# Patient Record
Sex: FEMALE | HISPANIC OR LATINO | Employment: UNEMPLOYED | URBAN - METROPOLITAN AREA
[De-identification: names, ages, dates, MRNs, and addresses within clinical notes are randomized per-mention and may not be internally consistent; named-entity substitution may affect disease eponyms.]

---

## 2017-08-11 ENCOUNTER — APPOINTMENT (OUTPATIENT)
Dept: RADIOLOGY | Facility: MEDICAL CENTER | Age: 82
End: 2017-08-11
Attending: EMERGENCY MEDICINE
Payer: COMMERCIAL

## 2017-08-11 ENCOUNTER — HOSPITAL ENCOUNTER (EMERGENCY)
Facility: MEDICAL CENTER | Age: 82
End: 2017-08-11
Attending: EMERGENCY MEDICINE
Payer: COMMERCIAL

## 2017-08-11 VITALS
WEIGHT: 163.14 LBS | TEMPERATURE: 97.5 F | DIASTOLIC BLOOD PRESSURE: 61 MMHG | HEIGHT: 55 IN | HEART RATE: 58 BPM | OXYGEN SATURATION: 91 % | BODY MASS INDEX: 37.76 KG/M2 | SYSTOLIC BLOOD PRESSURE: 126 MMHG | RESPIRATION RATE: 16 BRPM

## 2017-08-11 DIAGNOSIS — S09.90XA CLOSED HEAD INJURY, INITIAL ENCOUNTER: ICD-10-CM

## 2017-08-11 DIAGNOSIS — S29.019A STRAIN OF THORACIC REGION, INITIAL ENCOUNTER: ICD-10-CM

## 2017-08-11 DIAGNOSIS — S16.1XXA CERVICAL STRAIN, INITIAL ENCOUNTER: ICD-10-CM

## 2017-08-11 DIAGNOSIS — S20.212A CHEST WALL CONTUSION, LEFT, INITIAL ENCOUNTER: ICD-10-CM
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
## 2017-08-11 LAB
ALBUMIN SERPL BCP-MCNC: 3.5 G/DL (ref 3.2–4.9)
ALBUMIN/GLOB SERPL: 0.9 G/DL
ALP SERPL-CCNC: 82 U/L (ref 30–99)
ALT SERPL-CCNC: 21 U/L (ref 2–50)
ANION GAP SERPL CALC-SCNC: 7 MMOL/L (ref 0–11.9)
AST SERPL-CCNC: 29 U/L (ref 12–45)
BASOPHILS # BLD AUTO: 1.1 % (ref 0–1.8)
BASOPHILS # BLD: 0.12 K/UL (ref 0–0.12)
BILIRUB SERPL-MCNC: 0.8 MG/DL (ref 0.1–1.5)
BUN SERPL-MCNC: 19 MG/DL (ref 8–22)
CALCIUM SERPL-MCNC: 8.7 MG/DL (ref 8.4–10.2)
CHLORIDE SERPL-SCNC: 106 MMOL/L (ref 96–112)
CO2 SERPL-SCNC: 25 MMOL/L (ref 20–33)
CREAT SERPL-MCNC: 1.35 MG/DL (ref 0.5–1.4)
EOSINOPHIL # BLD AUTO: 1.66 K/UL (ref 0–0.51)
EOSINOPHIL NFR BLD: 15.8 % (ref 0–6.9)
ERYTHROCYTE [DISTWIDTH] IN BLOOD BY AUTOMATED COUNT: 48.8 FL (ref 35.9–50)
GFR SERPL CREATININE-BSD FRML MDRD: 38 ML/MIN/1.73 M 2
GLOBULIN SER CALC-MCNC: 3.9 G/DL (ref 1.9–3.5)
GLUCOSE SERPL-MCNC: 93 MG/DL (ref 65–99)
HCT VFR BLD AUTO: 38.5 % (ref 37–47)
HGB BLD-MCNC: 12.7 G/DL (ref 12–16)
IMM GRANULOCYTES # BLD AUTO: 0.03 K/UL (ref 0–0.11)
IMM GRANULOCYTES NFR BLD AUTO: 0.3 % (ref 0–0.9)
LYMPHOCYTES # BLD AUTO: 3.68 K/UL (ref 1–4.8)
LYMPHOCYTES NFR BLD: 34.9 % (ref 22–41)
MCH RBC QN AUTO: 31.6 PG (ref 27–33)
MCHC RBC AUTO-ENTMCNC: 33 G/DL (ref 33.6–35)
MCV RBC AUTO: 95.8 FL (ref 81.4–97.8)
MONOCYTES # BLD AUTO: 0.6 K/UL (ref 0–0.85)
MONOCYTES NFR BLD AUTO: 5.7 % (ref 0–13.4)
NEUTROPHILS # BLD AUTO: 4.44 K/UL (ref 2–7.15)
NEUTROPHILS NFR BLD: 42.2 % (ref 44–72)
NRBC # BLD AUTO: 0 K/UL
NRBC BLD AUTO-RTO: 0 /100 WBC
PLATELET # BLD AUTO: 272 K/UL (ref 164–446)
PMV BLD AUTO: 11.1 FL (ref 9–12.9)
POTASSIUM SERPL-SCNC: 4.2 MMOL/L (ref 3.6–5.5)
PROT SERPL-MCNC: 7.4 G/DL (ref 6–8.2)
RBC # BLD AUTO: 4.02 M/UL (ref 4.2–5.4)
SODIUM SERPL-SCNC: 138 MMOL/L (ref 135–145)
WBC # BLD AUTO: 10.5 K/UL (ref 4.8–10.8)

## 2017-08-11 PROCEDURE — 71260 CT THORAX DX C+: CPT

## 2017-08-11 PROCEDURE — 36415 COLL VENOUS BLD VENIPUNCTURE: CPT

## 2017-08-11 PROCEDURE — 72125 CT NECK SPINE W/O DYE: CPT

## 2017-08-11 PROCEDURE — 85025 COMPLETE CBC W/AUTO DIFF WBC: CPT

## 2017-08-11 PROCEDURE — 80053 COMPREHEN METABOLIC PANEL: CPT

## 2017-08-11 PROCEDURE — 99284 EMERGENCY DEPT VISIT MOD MDM: CPT

## 2017-08-11 PROCEDURE — 74176 CT ABD & PELVIS W/O CONTRAST: CPT

## 2017-08-11 PROCEDURE — 70450 CT HEAD/BRAIN W/O DYE: CPT

## 2017-08-11 RX ORDER — HYDROCODONE BITARTRATE AND ACETAMINOPHEN 5; 325 MG/1; MG/1
0.5 TABLET ORAL EVERY 6 HOURS PRN
Qty: 12 TAB | Refills: 0 | Status: SHIPPED | OUTPATIENT
Start: 2017-08-11

## 2017-08-11 ASSESSMENT — PAIN SCALES - GENERAL: PAINLEVEL_OUTOF10: 4

## 2017-08-11 NOTE — ED AVS SNAPSHOT
8/11/2017    Kita Luna  [address] [place] [address]    Dear Kita:    [facility] wants to ensure your discharge home is safe and you or your loved ones have had all of your questions answered regarding your care after you leave the hospital.    Below is a list of resources and contact information should you have any questions regarding your hospital stay, follow-up instructions, or active medical symptoms.    Questions or Concerns Regarding… Contact   Medical Questions Related to Your Discharge  (7 days a week, 8am-5pm) Contact a Nurse Care Coordinator   [phone]   Medical Questions Not Related to Your Discharge  (24 hours a day / 7 days a week)  Contact the Nurse Health Line   [phone]    Medications or Discharge Instructions Refer to your discharge packet   or contact your [facility] Primary Care Provider   [phone]   Follow-up Appointment(s) Schedule your appointment via The Hunt   or contact Scheduling [phone]   Billing Review your statement via The Hunt  or contact Billing [phone]   Medical Records Review your records via The Hunt   or contact Medical Records [phone]     You may receive a telephone call within two days of discharge. This call is to make certain you understand your discharge instructions and have the opportunity to have any questions answered. You can also easily access your medical information, test results and upcoming appointments via the The Hunt free online health management tool. You can learn more and sign up at Receptor/The Hunt. For assistance setting up your The Hunt account, please call [phone].    Once again, we want to ensure your discharge home is safe and that you have a clear understanding of any next steps in your care. If you have any questions or concerns, please do not hesitate to contact us, we are here for you. Thank you for choosing [facility] for your healthcare needs.    Sincerely,    Your [facility] Healthcare Team

## 2017-08-11 NOTE — ED AVS SNAPSHOT
Home Care Instructions                                                                                                                Kita Luna   MRN: [id number]    Department:  [facility], Emergency Dept   Date of Visit:  8/11/2017            [facility], Emergency Dept    [address]    [place] [address]    Phone:  [phone]      You were seen by     Claude Kim M.D.      Your Diagnosis Was     Fall, initial encounter     W19.XXXA       These are the medications you received during your hospitalization from 08/11/2017 1828 to 08/11/2017 2203     Date/Time Order Dose Route Action    08/11/2017 2215 hydrocodone-acetaminophen 2.5-108 mg/5mL (HYCET) solution 5 mL 5 mL Oral Refused      Follow-up Information     1. Follow up with Your Physician. Schedule an appointment as soon as possible for a visit in 2 days.    Specialty:  Emergency Medicine    Contact information    Varies        Medication Information     Review all of your home medications and newly ordered medications with your primary doctor and/or pharmacist as soon as possible. Follow medication instructions as directed by your doctor and/or pharmacist.     Please keep your complete medication list with you and share with your physician. Update the information when medications are discontinued, doses are changed, or new medications (including over-the-counter products) are added; and carry medication information at all times in the event of emergency situations.               Medication List      START taking these medications        Instructions    Morning Afternoon Evening Bedtime    hydrocodone-acetaminophen 5-325 MG Tabs per tablet   Commonly known as:  NORCO        Take 0.5 Tabs by mouth every 6 hours as needed.   Dose:  0.5 Tab                             Where to Get Your Medications      You can get these medications from any pharmacy     Bring a paper prescription for each of  these medications    - hydrocodone-acetaminophen 5-325 MG Tabs per tablet            Procedures and tests performed during your visit     CBC WITH DIFFERENTIAL    COMP METABOLIC PANEL    CONSENT FOR CONTRAST INJECTION    CT-CHEST,ABDOMEN,PELVIS W/O    CT-CSPINE WITHOUT PLUS RECONS    CT-HEAD W/O    ESTIMATED GFR    IV SALINE LOCK    IV Saline Lock        Discharge Instructions       Rest, follow-up with her doctor.  Return earlier for pain, or other concerns.  Take her pain.    Traumatismo Torácico Contuso  (Blunt Chest Trauma)   El traumatismo torácico contuso es conrado lesión causada por un golpe en el pecho. Estas lesiones suelen ser muy dolorosas. Generalmente resulta en un hematoma o en costillas rotas (fracturadas). La mayor parte de los hematomas y las fracturas de costillas por traumatismos torácicos contusos mejoran después de 1 a 3 semanas de reposo y uso de medicamentos para el dolor. Generalmente, los tejidos blandos de la pared torácica también se lesionan, lo que produce dolor y hematomas. Los órganos internos, coreen el corazón y los pulmones, también pueden sufrir lesiones. El traumatismo torácico contuso puede producir problemas médicos graves. Leonora tipo de lesión requiere atención médica inmediata.   CAUSAS   · Colisiones en vehículos de motor.  · Caídas.  · Violencia física.  · Lesiones deportivas.  SÍNTOMAS   · Dolor en el pecho. El dolor puede empeorar al moverse o respirar profundamente.  · Falta de aire.  · Aturdimiento.  · Hematomas.  · Sensibilidad.  · Hinchazón.  DIAGNÓSTICO   El médico le hará un examen físico. Podrán tomarle radiografías para comprobar si hay fracturas. Sin embargo, las fracturas pequeñas en las costillas pueden no aparecer en las radiografías hasta unos nabeel después de la lesión. Si se sospecha conrado lesión más grave, podrán indicarle otras pruebas de diagnóstico por imágenes. Puede incluir ecografías, tomografía computada (TC) o resonancia magnética (IMR).   TRATAMIENTO   El  tratamiento depende de la gravedad de la lesión. El médico podrá recetarle medicamentos para calmar el dolor e indicarle ejercicios de respiración profunda.   INSTRUCCIONES PARA EL CUIDADO EN EL HOGAR   · Limite radha actividades hasta que pueda moverse sin sentir demasiado dolor.  · No realice trabajos extenuantes hasta que la lesión se haya curado.  · Aplique hielo sobre la danielle lesionada.  · Ponga el hielo en conrado bolsa plástica.  · Colóquese conrado toalla entre la piel y la bolsa de hielo.  · Deje el hielo shaggy 15 a 20 minutos, 3 a 4 veces por día.  · Podrá utilizar conrado faja para las costillas para reducir el dolor según le hayan indicado.  · Realice inspiraciones, profundas según las indicaciones del médico, para mantener los pulmones limpios.  · Sólo tome medicamentos de venta amira o recetados para calmar el dolor, la fiebre, o el malestar, según las indicaciones de alonso médico.  SOLICITE ATENCIÓN MÉDICA DE INMEDIATO SI:  · Siente falta de aire o dolor en el pecho cada vez más intensos.  · Tose y escupe paige.  · Tiene náuseas, vómitos o dolor abdominal.  · Tiene fiebre.  · Se siente mareado, débil o se desmaya.  ASEGÚRESE DE QUE:   · Comprende estas instrucciones.  · Controlará alonso enfermedad.  · Solicitará ayuda de inmediato si no mejora o si empeora.     Esta información no tiene coreen fin reemplazar el consejo del médico. Asegúrese de hacerle al médico cualquier pregunta que tenga.  Distensión cervical  (Cervical Sprain)  Conrado distensión cervical es conrado lesión en el cris, en la que los tejidos austyn y fibrosos (ligamentos) que unen los huesos del cris, se distienden o se rompen. Conrado distensión cervical puede ser desde muy leve a muy grave. En los casos graves pueden hacer que las vértebras del cris se vuelvan inestables. [place] puede causar un daño en la médula valenzuela y puede yeimi lugar a graves problemas del sistema nervioso. La cantidad de tiempo que demora la mejoría de conrado distensión cervical depende  de la causa y de la extensión de la lesión. La mayoría de las veces se aaron en 1 a 3 semanas.  CAUSAS   Las distensiones graves pueden ser causadas por:   · Lesiones por deportes de contacto (coreen en el fútbol americano, rugby, carroll, hockey, automovilismo, gimnasia, buceo, artes marciales y boxeo).  · Colisiones en vehículos de motor.  · Lesiones de latigazo cervical. Esta es conrado lesión por movimiento brusco de adelante hacia atrás de la oneil y el cris.  · Caídas.  La causa de las distensiones cervicales leves pueden ser:   · Adoptar posiciones incómodas, coreen sostener el teléfono entre la oreja y el hombro.  · Sentarse en conrado silla que no ofrece el soporte adecuado.  · Trabajar en conrado luis de computadora mal diseñada.  · Las actividades que requieren mirar hacia arriba o hacia abajo shaggy largos períodos.  SÍNTOMAS   · Dolor, sensibilidad, rigidez, o sensación de ardor en la parte anterior, posterior o lateral del cris. Shaneka malestar puede aparecer inmediatamente después de la lesión o puede desarrollarse lentamente y no empezar hasta 24 horas o más después de la lesión.  · Dolor o sensibilidad que se siente directamente en la parte media posterior del cris.  · Dolor en el hombro o la danielle superior de la espalda.  · Capacidad limitada para  el cris.  · Dolor de oneil.  · Mareos.  · Debilidad, entumecimiento u hormigueo en las arsenio o los brazos.  · Espasmos musculares.  · Dificultades para tragar o comer.  · Sensibilidad e hinchazón en el cris.  DIAGNÓSTICO   La mayoría de las veces, el médico puede diagnosticar shaneka problema mediante la historia clínica y un examen físico. Jacques médico le preguntará acerca de lesiones previas y problemas conocidos coreen artritis en el cris. Podrán tomarle radiografías para determinar si hay otros problemas, coreen enfermedades en los huesos del cris. También puede ser necesario realizar otras pruebas, coreen tomografías computadas o resonancia magnética.      TRATAMIENTO   El tratamiento depende de la gravedad de la distensión. Las distensiones leves se pueden tratar con reposo, manteniendo el cris en alonso lugar (inmovilización) y usando medicamentos para el dolor. Las distensiones graves deben ser inmediatamente inmovilizadas. Será necesario completar el tratamiento para aliviar el dolor, los espasmos musculares y otros síntomas, y puede incluir.  · Medicamentos coreen calmantes para el dolor, anestésicos o relajantes musculares.  · Fisioterapia. Beatrice puede incluir ejercicios de elongación, fortalecimiento y entrenamiento de la postura. Los ejercicios y conrado mejor postura pueden ayudar a estabilizar el cris, fortalecer los músculos y evitar que los síntomas vuelvan a aparecer.  INSTRUCCIONES PARA EL CUIDADO EN EL HOGAR   · Aplique hielo sobre la danielle lesionada.  · Ponga el hielo en conrado bolsa plástica.  · Colóquese conrado toalla entre la piel y la bolsa de hielo.  · Deje el hielo shaggy 15 - 20 minutos y aplíquelo 3 - 4 veces por día.  · Si la lesión fue grave, le indicarán el uso de un collarín cervical. El collarín cervical es un collar de dos piezas para impedir que el cris se mueva mientras se aaron.  · Nose quite el collarín excepto que se lo indique alonso médico.  · Si tiene el ricardo whitney, manténgalo fuera del collarín.  · Consulte a alonso médico antes de hacerle ajustes. Los ajustes menores pueden ser requeridos con el tiempo para mejorar el confort y reducir la presión sobre la barbilla o en la parte posterior de la oneil.  · Si le permiten quitarse el collarín para lavarlo o darse un baño, siga las indicaciones de alonso médico acerca de cómo hacerlo con seguridad.  · Mantenga el collarín limpio pasando un paño con agua y jabón y secándolo olya. Si el collarín tiene almohadillas removibles, quítelas cada 1-2 días para lavarlas a mano con agua y jabón. Deje que se sequen al aire. Debe secarlas olya antes de volver a colocarlas en el collarín.  · Si le permiten quitarse  el collarín para lavarlo y darse un baño, lave y seque la piel del cris. Controle alonso piel para detectar irritación o llagas. Si las tiene, informe a alonso médico.  · No conduzca vehículos mientras usa el collarín.  · Sólo tome medicamentos de venta amira o recetados para calmar el dolor, el malestar o bajar la fiebre, según las indicaciones de alonso médico.  · Cumpla con todas las visitas de control, según le indique alonso médico.  · Cumpla con todas las sesiones de fisioterapia, según le indique alonso médico.  · Candice los ajustes necesarios en alonso lugar de trabajo para favorecer conrado buena postura.  · Evite las posiciones y actividades que empeoran los síntomas.  · Candice precalentamiento y elongue antes de comenzar conrado actividad para evitar problemas.  SOLICITE ATENCIÓN MÉDICA SI:   · El dolor no se stefanie con los medicamentos.  · No puede disminuir la dosis de analgésicos según lo planificado.  · Alonso nivel de actividad no mejora según lo esperado.  SOLICITE ATENCIÓN MÉDICA DE INMEDIATO SI:   · Presenta cualquier hemorragia.  · Siente malestar estomacal.  · Tiene signos de reacción alérgica a los medicamentos.  · Los síntomas empeoran.  · Le aparecen síntomas nuevos e inexplicables.  · Siente adormecimiento, hormigueo, debilidad o parálisis en alguna parte del cuerpo.  ASEGÚRESE DE QUE:   · Comprende estas instrucciones.  · Controlará alonso afección.  · Recibirá ayuda de inmediato si no mejora o si empeora.     Esta información no tiene coreen fin reemplazar el consejo del médico. Asegúrese de hacerle al médico cualquier pregunta que tenga.     Document Released: 03/16/2010 Document Revised: 10/08/2014  Elsevier Interactive Patient Education ©2016 [employer]    Traumatismo en la oneil - Adultos  (Head Injury, Adult)  Tiene conrado lesión en la oneil. Después de sufrir conrado lesión en la oneil, es normal tener albert de oneil y vomitar. Si se duerme, debería resultar fácil despertarlo. Algunas veces debe permanecer en el hospital. La  mayoría de los problemas ocurren shaggy las primeras 24 horas. Los efectos secundarios pueden aparecer entre los 7 y 10 días posteriores a la lesión.   ¿CUÁLES SON LOS TIPOS DE LESIONES EN LA HEBER?  Las lesiones en la heber pueden ser leves y provocar un bulto. Algunas lesiones en la heber pueden ser más graves. Algunas de las lesiones graves en la heber son:  · Lesión que provoque un impacto en el cerebro (conmoción).  · Hematoma en el cerebro (contusión). [place] significa que hay hemorragia en el cerebro que puede causar un edema.  · Fisura en el cráneo (fractura de cráneo).  · Hemorragia en el cerebro que se acumula, se coagula y forma un bulto (hematoma).  ¿CUÁNDO TIFFANIE OBTENER AYUDA DE INMEDIATO?   · Está confundido o somnoliento.  · No pueden despertarlo.  · Tiene malestar estomacal (náuseas) o vómitos.  · Los mareos o la inestabilidad empeoran.  · Sufre albert de heber intensos y prolongados que no se alivian con medicamentos. [place] los medicamentos solamente coreen se lo haya indicado el médico.  · No puede  los brazos o las piernas normalmente.  · No puede caminar.  · Observa cambios en los puntos negros en el centro de la parte coloreada del mika (pupila).  · Presenta nithin secreción josemanuel o con paige que proviene de la nariz o de los oídos.  · Tiene dificultad para reggie.  Shaggy las próximas 24 horas posteriores a la lesión, debe permanecer con alguna persona que pueda cuidarlo. Esta persona debe pedir ayuda de inmediato (llamar al 911 en los [place] si usted empieza a tener temblores y no puede controlarlos (tiene convulsiones), se desmaya o no puede despertarse.  ¿CÓMO PUEDO PREVENIR NITHIN LESIÓN EN LA HEBER EN EL FUTURO?  · Use un cinturón de seguridad.  · Use un bette si cory en bicicleta y practica deportes, coreen fútbol americano.  · Evite las actividades peligrosas que puedan realizarse en la casa.  ¿CUÁNDO PUEDO RETOMAR LAS ACTIVIDADES NORMALES Y EL ATLETISMO?  Consulte a alonso médico antes de  hacer estas actividades. No debe hacer actividades normales ni practicar deportes de contacto hasta 1 semana después de que hayan desaparecido los siguientes síntomas:  · Dolor de oneil akiko.  · Mareos.  · Atención deficiente.  · Confusión.  · Problemas de memoria.  · Malestar estomacal o vómitos.  · Cansancio.  · Irritabilidad.  · Intolerancia a la meghan brillante o los ruidos austyn.  · Ansiedad o depresión.  · Sueño agitado.  ASEGÚRESE DE QUE:   · Comprende estas instrucciones.  · Controlará alonso afección.  · Recibirá ayuda de inmediato si no mejora o si empeora.     Esta información no tiene coreen fin reemplazar el consejo del médico. Asegúrese de hacerle al médico cualquier pregunta que tenga.     Document Released: 10/08/2014 Document Revised: 01/08/2016  [employer] Interactive Patient Education ©2016 [employer]            Patient Information     Patient Information    Following emergency treatment: all patient requiring follow-up care must return either to a private physician or a clinic if your condition worsens before you are able to obtain further medical attention, please return to the emergency room.     Billing Information    At [facility], we work to make the billing process streamlined for our patients.  Our Representatives are here to answer any questions you may have regarding your hospital bill.  If you have insurance coverage and have supplied your insurance information to us, we will submit a claim to your insurer on your behalf.  Should you have any questions regarding your bill, we can be reached online or by phone as follows:  Online: You are able pay your bills online or live chat with our representatives about any billing questions you may have. We are here to help Monday - Friday from 8:00am to 7:30pm and 9:00am - 12:00pm on Saturdays.  Please visit [email or web address]  for more information.   Phone:  [phone] or [phone]    Please note that  your emergency physician, surgeon, pathologist, radiologist, anesthesiologist, and other specialists are not employed by [facility] and will therefore bill separately for their services.  Please contact them directly for any questions concerning their bills at the numbers below:     Emergency Physician Services:  [phone]  [facility]:  [phone]  [facility]:  [phone]  [facility]:  [phone]    1. Your final bill may vary from the amount quoted upon discharge if all procedures are not complete at that time, or if your doctor has additional procedures of which we are not aware. You will receive an additional bill if you return to the Emergency Department at [facility] for suture removal regardless of the facility of which the sutures were placed.     2. Please arrange for settlement of this account at the emergency registration.    3. All self-pay accounts are due in full at the time of treatment.  If you are unable to meet this obligation then payment is expected within 4-5 days.     4. If you have had radiology studies (CT, X-ray, Ultrasound, MRI), you have received a preliminary result during your emergency department visit. Please contact the radiology department [phone] to receive a copy of your final result. Please discuss the Final result with your primary physician or with the follow up physician provided.     Crisis Hotline:  [place] Crisis Hotline:  3-314-FEOGDLR or 1-498.420.5979  [place] Crisis Hotline:    1-947.667.8450 or 241-320-9322         ED Discharge Follow Up Questions    1. In order to provide you with very good care, we would like to follow up with a phone call in the next few days.  May we have your permission to contact you?     YES /  NO    2. What is the best phone number to call you? (       )_____-__________    3. What is the best time to call you?      Morning  /  Afternoon  /  Evening                   Patient  Signature:  ____________________________________________________________    Date:  ____________________________________________________________

## 2017-08-11 NOTE — ED AVS SNAPSHOT
Online Agility Access Code: [email or web address]  Expires: 9/10/2017  9:55 PM    Your email address is not on file at [facility].  Email Addresses are required for you to sign up for Online Agility, please contact [phone] to verify your personal information and to provide your email address prior to attempting to register for Online Agility.    Kita Luna  [address] [place] [address]    Online Agility  A secure, online tool to manage your health information     [facility]’s Online Agility® is a secure, online tool that connects you to your personalized health information from the privacy of your home -- day or night - making it very easy for you to manage your healthcare. Once the activation process is completed, you can even access your medical information using the Online Agility clemencia, which is available for free in the Apple Clemencia store or Google Play store.     To learn more about Online Agility, visit [email or web address]    There are two levels of access available (as shown below):   My Chart Features  [facility] Primary Care Doctor [facility]  Specialists [facility]  Urgent  Care Non-[facility] Primary Care Doctor   Email your healthcare team securely and privately 24/7 X X X    Manage appointments: schedule your next appointment; view details of past/upcoming appointments X      Request prescription refills. X      View recent personal medical records, including lab and immunizations X X X X   View health record, including health history, allergies, medications X X X X   Read reports about your outpatient visits, procedures, consult and ER notes X X X X   See your discharge summary, which is a recap of your hospital and/or ER visit that includes your diagnosis, lab results, and care plan X X  X     How to register for The Huffington Postt:  Once your e-mail address has been verified, follow the following steps to sign up for The Huffington Postt.     1. Go to  [email or web address]  2. Click on the Sign Up Now box, which takes you to the New Member Sign Up page. You will  need to provide the following information:  a. Enter your Yab[employer]u Access Code exactly as it appears at the top of this page. (You will not need to use this code after you’ve completed the sign-up process. If you do not sign up before the expiration date, you must request a new code.)   b. Enter your date of birth.   c. Enter your home email address.   d. Click Submit, and follow the next screen’s instructions.  3. Create a eLong.comt ID. This will be your Yabidu login ID and cannot be changed, so think of one that is secure and easy to remember.  4. Create a Yabidu password. You can change your password at any time.  5. Enter your Password Reset Question and Answer. This can be used at a later time if you forget your password.   6. Enter your e-mail address. This allows you to receive e-mail notifications when new information is available in [employer]ab[employer].  7. Click Sign Up. You can now view your health information.    For assistance activating your Yab[employer] account, call [phone]

## 2017-08-12 NOTE — ED PROVIDER NOTES
"ED Provider Note    CHIEF COMPLAINT  Chief Complaint   Patient presents with   • T-5000 FALL   • Back Pain       HPI  Kita Luna is a 81 y.o. female who presents to the ER.  Clear back pain, chest pain, abdominal pain, head pain and neck pain after a fall.  Patient was walking with a walker going up a ramp when she hit a small bump and the ramp, though patient then fell, somehow the walker was behind her and she fell with her back, striking the walker and her head hitting the ground.  She left flank pain, diffuse abdominal pain, back pain, neck pain and head pain since that time.  The patient describes a worsened.  She denies any other acute concerns or complaints.  No hematemesis nor hematochezia.  No blood thinners.  Unclear LOC.  No other injuries or complaints.    REVIEW OF SYSTEMS  See HPI for further details. All other systems are negative.    PAST MEDICAL HISTORY  Past Medical History   Diagnosis Date   • Asthma        FAMILY HISTORY  History reviewed. No pertinent family history.    SOCIAL HISTORY  Social History     Social History   • Marital Status: N/A     Spouse Name: N/A   • Number of Children: N/A   • Years of Education: N/A     Social History Main Topics   • Smoking status: Never Smoker    • Smokeless tobacco: Never Used   • Alcohol Use: No   • Drug Use: No   • Sexual Activity: Not Asked     Other Topics Concern   • None     Social History Narrative   • None       SURGICAL HISTORY  History reviewed. No pertinent past surgical history.    CURRENT MEDICATIONS  Home Medications     Reviewed by Mitul Toscano R.N. (Registered Nurse) on 08/11/17 at 1836  Med List Status: Unable to Obtain    Medication Last Dose Status          Patient Freddy Taking any Medications                        ALLERGIES  Allergies   Allergen Reactions   • Penicillins Swelling       PHYSICAL EXAM  VITAL SIGNS: /61 mmHg  Pulse 64  Temp(Src) 36.4 °C (97.5 °F)  Resp 18  Ht 1.397 m (4' 7\")  Wt 74 kg (163 lb 2.3 " oz)  BMI 37.92 kg/m2  SpO2 92%     Constitutional: Well developed, Well nourished, No acute distress, Non-toxic appearance.   HENT: Normocephalic, posterior scalp contusion, no laceration, Bilateral external ears normal, Oropharynx moist, No oral exudates, Nose normal.   Eyes: PERRL, EOMI, Conjunctiva normal, No discharge.   Neck:   Cervical spine tenderness.  Not initially ranged.  Lymphatic: No lymphadenopathy noted.   Cardiovascular: Normal heart rate, Normal rhythm, No murmurs, No rubs, No gallops.   Thorax & Lungs: Normal breath sounds, No respiratory distress, No wheezing, that sided chest wall tenderness.  No crepitus  Abdomen: Bowel sounds normal, Soft, there is left upper quadrant, no signs of trauma  Skin: Warm, Dry, No erythema, No rash.   Back: Tenderness in the midthoracic and upper lumbar spine.  Musculoskeletal: Good range of motion in all major joints. No tenderness to palpation or major deformities noted.   Neurologic: Alert, Normal motor function, Normal sensory function, No focal deficits noted.   Psychiatric: Affect anxious.       RADIOLOGY/PROCEDURES  CT-CHEST,ABDOMEN,PELVIS W/O   Final Result      1.  No evidence for acute intrathoracic injury.   2.  Probable liver cysts.   3.  No gross evidence for acute intra-abdominal trauma.   4.  Probable chronic T12 compression deformity.   5.  No gross acute thoracic or lumbar spine fracture.   6.  Limited by lack of IV contrast.   7.  4 mm RIGHT middle lobe subpleural nodule.      Low Risk: No routine follow-up      High Risk: Optional CT at 12 months      Comments: Nodules less than 6 mm do not require routine follow-up, but certain patients at high risk with suspicious nodule morphology, upper lobe location, or both may warrant 12-month follow-up.      Low Risk - Minimal or absent history of smoking and of other known risk factors.      High Risk - History of smoking or of other known risk factors.      Note: These recommendations do not apply to  lung cancer screening, patients with immunosuppression, or patients with known primary cancer.      Fleischner Society 2017 Guidelines for Management of Incidentally Detected Pulmonary Nodules in Adults      CT-CSPINE WITHOUT PLUS RECONS   Final Result      1.  Mild degenerative changes.   2.  No cervical spine fracture or subluxation.      CT-HEAD W/O   Final Result      1.  Diffuse atrophy.   2.  No acute intracranial hemorrhage or territorial infarct.               COURSE & MEDICAL DECISION MAKING  Pertinent Labs & Imaging studies reviewed. (See chart for details)  An IV is established, basic labs are obtained.  We have ordered a CT scan.    History is obtained with the patient and the family acting as suspension turgor by their request.    Patient's labs are reassuring.  She has an IV contrast allergy, so she was not scan with contrast.    CT is limited by lack of contrast administration, but I think is sensitive enough.  She has no signs of head injury or neck injury.  She has a chronic T12 fracture.  She is medically chronic findings on her CT, including pulmonary nodules and findings.  She'll be made aware of by given a copy of the CT and the family and instructions to see her doctor.    As no signs of intrathoracic or intra-abdominal trauma, or other injuries that would Require admission or further workup.  Patient otherwise looks well.  She is able to ambulate.    The patient is noted to have elevated blood pressure.  All here in the ER.  She will be discharged to care of her family to return to the ER for worsening pain or other concerns.  Recommend over-the-counter pain medicines and follow-up.  Family is agreeable to plan.  Questions are answered.    FINAL IMPRESSION  1. Fall, initial encounter    2. Chest wall contusion, left, initial encounter    3. Closed head injury, initial encounter    4. Cervical strain, initial encounter    5. Strain of thoracic region, initial encounter      Patient is  requesting a prescription for pain medicine.  She is prescribed norco.  She does not live locally.  There is nothing a prescription history.  She is given high set prior to discharge.       The patient has elevated blood pressure.  This is chronic.  She'll see her doctor when she returns to [place].  She is given a copy of her labs and CTs and told to follow-up and review this with her physician.      Electronically signed by: Claude Kim, 8/11/2017 6:56 PM

## 2017-08-12 NOTE — ED NOTES
ERP at bedside. Pt agrees with plan of care discussed by ERP. AIDET acknowledged with patient. IV established. Blood sent to lab. Ralph in low position, side rail up for pt safety. Call light within reach. Will continue to monitor.

## 2017-08-12 NOTE — ED NOTES
Family member given discharge instructions.  Pt refused pain meds.  VSS- discharged with written instructions to follow up with MD and rest.  Pt out in wheelchair with family members

## 2017-08-12 NOTE — DISCHARGE INSTRUCTIONS
Rest, follow-up with her doctor.  Return earlier for pain, or other concerns.  Take her pain.    Traumatismo Torácico Contuso  (Blunt Chest Trauma)   El traumatismo torácico contuso es conrado lesión causada por un golpe en el pecho. Estas lesiones suelen ser muy dolorosas. Generalmente resulta en un hematoma o en costillas rotas (fracturadas). La mayor parte de los hematomas y las fracturas de costillas por traumatismos torácicos contusos mejoran después de 1 a 3 semanas de reposo y uso de medicamentos para el dolor. Generalmente, los tejidos blandos de la pared torácica también se lesionan, lo que produce dolor y hematomas. Los órganos internos, coreen el corazón y los pulmones, también pueden sufrir lesiones. El traumatismo torácico contuso puede producir problemas médicos graves. Leonora tipo de lesión requiere atención médica inmediata.   CAUSAS   · Colisiones en vehículos de motor.  · Caídas.  · Violencia física.  · Lesiones deportivas.  SÍNTOMAS   · Dolor en el pecho. El dolor puede empeorar al moverse o respirar profundamente.  · Falta de aire.  · Aturdimiento.  · Hematomas.  · Sensibilidad.  · Hinchazón.  DIAGNÓSTICO   El médico le hará un examen físico. Podrán tomarle radiografías para comprobar si hay fracturas. Sin embargo, las fracturas pequeñas en las costillas pueden no aparecer en las radiografías hasta unos nabeel después de la lesión. Si se sospecha conrado lesión más grave, podrán indicarle otras pruebas de diagnóstico por imágenes. Puede incluir ecografías, tomografía computada (TC) o resonancia magnética (IMR).   TRATAMIENTO   El tratamiento depende de la gravedad de la lesión. El médico podrá recetarle medicamentos para calmar el dolor e indicarle ejercicios de respiración profunda.   INSTRUCCIONES PARA EL CUIDADO EN EL HOGAR   · Limite radha actividades hasta que pueda moverse sin sentir demasiado dolor.  · No realice trabajos extenuantes hasta que la lesión se haya curado.  · Aplique hielo sobre la danielle  lesionada.  · Ponga el hielo en conrado bolsa plástica.  · Colóquese conrado toalla entre la piel y la bolsa de hielo.  · Deje el hielo shaggy 15 a 20 minutos, 3 a 4 veces por día.  · Podrá utilizar conrado faja para las costillas para reducir el dolor según le hayan indicado.  · Realice inspiraciones, profundas según las indicaciones del médico, para mantener los pulmones limpios.  · Sólo tome medicamentos de venta amira o recetados para calmar el dolor, la fiebre, o el malestar, según las indicaciones de alonso médico.  SOLICITE ATENCIÓN MÉDICA DE INMEDIATO SI:  · Siente falta de aire o dolor en el pecho cada vez más intensos.  · Tose y escupe paige.  · Tiene náuseas, vómitos o dolor abdominal.  · Tiene fiebre.  · Se siente mareado, débil o se desmaya.  ASEGÚRESE DE QUE:   · Comprende estas instrucciones.  · Controlará alonso enfermedad.  · Solicitará ayuda de inmediato si no mejora o si empeora.     Esta información no tiene coreen fin reemplazar el consejo del médico. Asegúrese de hacerle al médico cualquier pregunta que tenga.  Distensión cervical  (Cervical Sprain)  Conrado distensión cervical es conrado lesión en el cris, en la que los tejidos austyn y fibrosos (ligamentos) que unen los huesos del cris, se distienden o se rompen. Conrado distensión cervical puede ser desde muy leve a muy grave. En los casos graves pueden hacer que las vértebras del cris se vuelvan inestables. [place] puede causar un daño en la médula valenzuela y puede yeimi lugar a graves problemas del sistema nervioso. La cantidad de tiempo que demora la mejoría de conrado distensión cervical depende de la causa y de la extensión de la lesión. La mayoría de las veces se aaron en 1 a 3 semanas.  CAUSAS   Las distensiones graves pueden ser causadas por:   · Lesiones por deportes de contacto (coreen en el fútbol americano, rugby, carroll, hockey, automovilismo, gimnasia, buceo, artes marciales y boxeo).  · Colisiones en vehículos de motor.  · Lesiones de latigazo cervical. Esta es conrado  lesión por movimiento brusco de adelante hacia atrás de la oneil y el cris.  · Caídas.  La causa de las distensiones cervicales leves pueden ser:   · Adoptar posiciones incómodas, coreen sostener el teléfono entre la oreja y el hombro.  · Sentarse en conrado silla que no ofrece el soporte adecuado.  · Trabajar en conrado luis de computadora mal diseñada.  · Las actividades que requieren mirar hacia arriba o hacia abajo shaggy largos períodos.  SÍNTOMAS   · Dolor, sensibilidad, rigidez, o sensación de ardor en la parte anterior, posterior o lateral del cris. [place] malestar puede aparecer inmediatamente después de la lesión o puede desarrollarse lentamente y no empezar hasta 24 horas o más después de la lesión.  · Dolor o sensibilidad que se siente directamente en la parte media posterior del cris.  · Dolor en el hombro o la danielle superior de la espalda.  · Capacidad limitada para  el cris.  · Dolor de oneil.  · Mareos.  · Debilidad, entumecimiento u hormigueo en las arsenio o los brazos.  · Espasmos musculares.  · Dificultades para tragar o comer.  · Sensibilidad e hinchazón en el cris.  DIAGNÓSTICO   La mayoría de las veces, el médico puede diagnosticar shaneka problema mediante la historia clínica y un examen físico. Alonso médico le preguntará acerca de lesiones previas y problemas conocidos coreen artritis en el cris. Podrán tomarle radiografías para determinar si hay otros problemas, coreen enfermedades en los huesos del cris. También puede ser necesario realizar otras pruebas, coreen tomografías computadas o resonancia magnética.   TRATAMIENTO   El tratamiento depende de la gravedad de la distensión. Las distensiones leves se pueden tratar con reposo, manteniendo el cris en alonso lugar (inmovilización) y usando medicamentos para el dolor. Las distensiones graves deben ser inmediatamente inmovilizadas. Será necesario completar el tratamiento para aliviar el dolor, los espasmos musculares y otros síntomas, y puede  incluir.  · Medicamentos coreen calmantes para el dolor, anestésicos o relajantes musculares.  · Fisioterapia. [place] puede incluir ejercicios de elongación, fortalecimiento y entrenamiento de la postura. Los ejercicios y conrado mejor postura pueden ayudar a estabilizar el cris, fortalecer los músculos y evitar que los síntomas vuelvan a aparecer.  INSTRUCCIONES PARA EL CUIDADO EN EL HOGAR   · Aplique hielo sobre la danielle lesionada.  · Ponga el hielo en conrado bolsa plástica.  · Colóquese conrado toalla entre la piel y la bolsa de hielo.  · Deje el hielo shaggy 15 - 20 minutos y aplíquelo 3 - 4 veces por día.  · Si la lesión fue grave, le indicarán el uso de un collarín cervical. El collarín cervical es un collar de dos piezas para impedir que el cris se mueva mientras se aaron.  · Nose quite el collarín excepto que se lo indique alonso médico.  · Si tiene el ricardo whitney, manténgalo fuera del collarín.  · Consulte a alonso médico antes de hacerle ajustes. Los ajustes menores pueden ser requeridos con el tiempo para mejorar el confort y reducir la presión sobre la barbilla o en la parte posterior de la oneil.  · Si le permiten quitarse el collarín para lavarlo o darse un baño, siga las indicaciones de alonso médico acerca de cómo hacerlo con seguridad.  · Mantenga el collarín limpio pasando un paño con agua y jabón y secándolo olya. Si el collarín tiene almohadillas removibles, quítelas cada 1-2 días para lavarlas a mano con agua y jabón. Deje que se sequen al aire. Debe secarlas olya antes de volver a colocarlas en el collarín.  · Si le permiten quitarse el collarín para lavarlo y darse un baño, lave y seque la piel del cris. Controle alonso piel para detectar irritación o llagas. Si las tiene, informe a alonso médico.  · No conduzca vehículos mientras usa el collarín.  · Sólo tome medicamentos de venta amira o recetados para calmar el dolor, el malestar o bajar la fiebre, según las indicaciones de alonso médico.  · Cumpla con todas las visitas  de control, según le indique alonso médico.  · Cumpla con todas las sesiones de fisioterapia, según le indique alonso médico.  · Candice los ajustes necesarios en alonso lugar de trabajo para favorecer conrado buena postura.  · Evite las posiciones y actividades que empeoran los síntomas.  · Candice precalentamiento y elongue antes de comenzar conrado actividad para evitar problemas.  SOLICITE ATENCIÓN MÉDICA SI:   · El dolor no se stefanie con los medicamentos.  · No puede disminuir la dosis de analgésicos según lo planificado.  · Alonso nivel de actividad no mejora según lo esperado.  SOLICITE ATENCIÓN MÉDICA DE INMEDIATO SI:   · Presenta cualquier hemorragia.  · Siente malestar estomacal.  · Tiene signos de reacción alérgica a los medicamentos.  · Los síntomas empeoran.  · Le aparecen síntomas nuevos e inexplicables.  · Siente adormecimiento, hormigueo, debilidad o parálisis en alguna parte del cuerpo.  ASEGÚRESE DE QUE:   · Comprende estas instrucciones.  · Controlará alonso afección.  · Recibirá ayuda de inmediato si no mejora o si empeora.     Esta información no tiene coreen fin reemplazar el consejo del médico. Asegúrese de hacerle al médico cualquier pregunta que tenga.     Document Released: 03/16/2010 Document Revised: 10/08/2014  Elsevier Interactive Patient Education ©2016 Elsevier Inc[employer]    Traumatismo en la heber - Adultos  (Head Injury, Adult)  Tiene conrado lesión en la heber. Después de sufrir conrado lesión en la heber, es normal tener albert de heber y vomitar. Si se duerme, debería resultar fácil despertarlo. Algunas veces debe permanecer en el hospital. La mayoría de los problemas ocurren shaggy las primeras 24 horas. Los efectos secundarios pueden aparecer entre los 7 y 10 días posteriores a la lesión.   ¿CUÁLES SON LOS TIPOS DE LESIONES EN LA HEBER?  Las lesiones en la heber pueden ser leves y provocar un bulto. Algunas lesiones en la heber pueden ser más graves. Algunas de las lesiones graves en la heber son:  · Lesión que  provoque un impacto en el cerebro (conmoción).  · Hematoma en el cerebro (contusión). [place] significa que hay hemorragia en el cerebro que puede causar un edema.  · Fisura en el cráneo (fractura de cráneo).  · Hemorragia en el cerebro que se acumula, se coagula y forma un bulto (hematoma).  ¿CUÁNDO TIFFANIE OBTENER AYUDA DE INMEDIATO?   · Está confundido o somnoliento.  · No pueden despertarlo.  · Tiene malestar estomacal (náuseas) o vómitos.  · Los mareos o la inestabilidad empeoran.  · Sufre albert de heber intensos y prolongados que no se alivian con medicamentos. [place] los medicamentos solamente coreen se lo haya indicado el médico.  · No puede  los brazos o las piernas normalmente.  · No puede caminar.  · Observa cambios en los puntos negros en el centro de la parte coloreada del mika (pupila).  · Presenta nithin secreción josemanuel o con paige que proviene de la nariz o de los oídos.  · Tiene dificultad para reggie.  Omer las próximas 24 horas posteriores a la lesión, debe permanecer con alguna persona que pueda cuidarlo. Esta persona debe pedir ayuda de inmediato (llamar al 911 en los [place] si usted empieza a tener temblores y no puede controlarlos (tiene convulsiones), se desmaya o no puede despertarse.  ¿CÓMO PUEDO PREVENIR NITHIN LESIÓN EN LA HEBER EN EL FUTURO?  · Use un cinturón de seguridad.  · Use un bette si cory en bicicleta y practica deportes, coreen fútbol americano.  · Evite las actividades peligrosas que puedan realizarse en la casa.  ¿CUÁNDO PUEDO RETOMAR LAS ACTIVIDADES NORMALES Y EL ATLETISMO?  Consulte a alonso médico antes de hacer estas actividades. No debe hacer actividades normales ni practicar deportes de contacto hasta 1 semana después de que hayan desaparecido los siguientes síntomas:  · Dolor de heber akiko.  · Mareos.  · Atención deficiente.  · Confusión.  · Problemas de memoria.  · Malestar estomacal o vómitos.  · Cansancio.  · Irritabilidad.  · Intolerancia a la meghan brillante o los ruidos  austyn.  · Ansiedad o depresión.  · Sueño agitado.  ASEGÚRESE DE QUE:   · Comprende estas instrucciones.  · Controlará alosno afección.  · Recibirá ayuda de inmediato si no mejora o si empeora.     Esta información no tiene coreen fin reemplazar el consejo del médico. Asegúrese de hacerle al médico cualquier pregunta que tenga.     Document Released: 10/08/2014 Document Revised: 01/08/2016  Elsevier Interactive Patient Education ©2016 Elsevier Inc.

## 2021-01-15 DIAGNOSIS — Z23 NEED FOR VACCINATION: ICD-10-CM
